# Patient Record
Sex: FEMALE
[De-identification: names, ages, dates, MRNs, and addresses within clinical notes are randomized per-mention and may not be internally consistent; named-entity substitution may affect disease eponyms.]

---

## 2018-08-15 ENCOUNTER — HOSPITAL (OUTPATIENT)
Dept: OTHER | Age: 28
End: 2018-08-15
Attending: EMERGENCY MEDICINE

## 2018-08-15 LAB
ALBUMIN SERPL-MCNC: 4 GM/DL (ref 3.6–5.1)
ALBUMIN/GLOB SERPL: 1.2 {RATIO} (ref 1–2.4)
ALP SERPL-CCNC: 53 UNIT/L (ref 45–117)
ALT SERPL-CCNC: 40 UNIT/L
AMORPH SED URNS QL MICRO: ABNORMAL
ANALYZER ANC (IANC): ABNORMAL
ANION GAP SERPL CALC-SCNC: 10 MMOL/L (ref 10–20)
APPEARANCE UR: ABNORMAL
AST SERPL-CCNC: 25 UNIT/L
BASOPHILS # BLD: 0 THOUSAND/MCL (ref 0–0.3)
BASOPHILS NFR BLD: 0 %
BILIRUB SERPL-MCNC: 0.4 MG/DL (ref 0.2–1)
BILIRUB UR QL: NEGATIVE
BUN SERPL-MCNC: 12 MG/DL (ref 6–20)
BUN/CREAT SERPL: 16 (ref 7–25)
CALCIUM SERPL-MCNC: 8.4 MG/DL (ref 8.4–10.2)
CAOX CRY URNS QL MICRO: ABNORMAL
CHLORIDE: 100 MMOL/L (ref 98–107)
CO2 SERPL-SCNC: 29 MMOL/L (ref 21–32)
COLOR UR: YELLOW
CREAT SERPL-MCNC: 0.75 MG/DL (ref 0.51–0.95)
DIFFERENTIAL METHOD BLD: ABNORMAL
EOSINOPHIL # BLD: 0 THOUSAND/MCL (ref 0.1–0.5)
EOSINOPHIL NFR BLD: 0 %
EPITH CASTS #/AREA URNS LPF: ABNORMAL /[LPF]
ERYTHROCYTE [DISTWIDTH] IN BLOOD: 12.5 % (ref 11–15)
FATTY CASTS #/AREA URNS LPF: ABNORMAL /[LPF]
GLOBULIN SER-MCNC: 3.4 GM/DL (ref 2–4)
GLUCOSE SERPL-MCNC: 112 MG/DL (ref 65–99)
GLUCOSE UR-MCNC: NEGATIVE MG/DL
GRAN CASTS #/AREA URNS LPF: ABNORMAL /[LPF]
HCG POINT OF CARE (5HGRST): NEGATIVE
HEMATOCRIT: 41 % (ref 36–46.5)
HGB BLD-MCNC: 14.2 GM/DL (ref 12–15.5)
HGB UR QL: NEGATIVE
HYALINE CASTS #/AREA URNS LPF: ABNORMAL /[LPF]
KETONES UR-MCNC: ABNORMAL MG/DL
LEUKOCYTE ESTERASE UR QL STRIP: NEGATIVE
LIPASE SERPL-CCNC: 165 UNIT/L (ref 73–393)
LYMPHOCYTES # BLD: 0.7 THOUSAND/MCL (ref 1–4.8)
LYMPHOCYTES NFR BLD: 6 %
MCH RBC QN AUTO: 31.1 PG (ref 26–34)
MCHC RBC AUTO-ENTMCNC: 34.6 GM/DL (ref 32–36.5)
MCV RBC AUTO: 89.9 FL (ref 78–100)
MICROSCOPIC (MT): ABNORMAL
MIXED CELL CASTS #/AREA URNS LPF: ABNORMAL /[LPF]
MONOCYTES # BLD: 0.6 THOUSAND/MCL (ref 0.3–0.9)
MONOCYTES NFR BLD: 5 %
MUCOUS THREADS URNS QL MICRO: ABNORMAL
NEUTROPHILS # BLD: 10.5 THOUSAND/MCL (ref 1.8–7.7)
NEUTROPHILS NFR BLD: 89 %
NEUTS SEG NFR BLD: ABNORMAL %
NITRITE UR QL: NEGATIVE
NRBC (NRBCRE): ABNORMAL
PH UR: 5 UNIT (ref 5–7)
PLATELET # BLD: 253 THOUSAND/MCL (ref 140–450)
POTASSIUM SERPL-SCNC: 4 MMOL/L (ref 3.4–5.1)
PROT SERPL-MCNC: 7.4 GM/DL (ref 6.4–8.2)
PROT UR QL: NEGATIVE MG/DL
RBC # BLD: 4.56 MILLION/MCL (ref 4–5.2)
RBC CASTS #/AREA URNS LPF: ABNORMAL /[LPF]
RENAL EPI CELLS #/AREA URNS HPF: ABNORMAL /[HPF]
SODIUM SERPL-SCNC: 135 MMOL/L (ref 135–145)
SP GR UR: 1.01 (ref 1–1.03)
SPECIMEN SOURCE: ABNORMAL
SPERM URNS QL MICRO: ABNORMAL
T VAGINALIS URNS QL MICRO: ABNORMAL
TRI-PHOS CRY URNS QL MICRO: ABNORMAL
URATE CRY URNS QL MICRO: ABNORMAL
URNS CMNT MICRO: ABNORMAL
UROBILINOGEN UR QL: 0.2 MG/DL (ref 0–1)
WAXY CASTS #/AREA URNS LPF: ABNORMAL /[LPF]
WBC # BLD: 11.9 THOUSAND/MCL (ref 4.2–11)
WBC CASTS #/AREA URNS LPF: ABNORMAL /[LPF]
YEAST HYPHAE URNS QL MICRO: ABNORMAL
YEAST URNS QL MICRO: ABNORMAL

## 2022-12-19 ENCOUNTER — OFFICE VISIT (OUTPATIENT)
Dept: FAMILY MEDICINE CLINIC | Age: 32
End: 2022-12-19
Payer: COMMERCIAL

## 2022-12-19 VITALS
DIASTOLIC BLOOD PRESSURE: 80 MMHG | TEMPERATURE: 98.5 F | SYSTOLIC BLOOD PRESSURE: 100 MMHG | WEIGHT: 156 LBS | HEART RATE: 69 BPM | OXYGEN SATURATION: 99 %

## 2022-12-19 DIAGNOSIS — N94.9 VAGINAL BURNING: ICD-10-CM

## 2022-12-19 DIAGNOSIS — N89.8 VAGINAL ITCHING: Primary | ICD-10-CM

## 2022-12-19 LAB
BILIRUBIN, POC: NEGATIVE
BLOOD URINE, POC: ABNORMAL
CLARITY, POC: CLEAR
COLOR, POC: ABNORMAL
GLUCOSE URINE, POC: NEGATIVE
KETONES, POC: NEGATIVE
LEUKOCYTE EST, POC: ABNORMAL
NITRITE, POC: NEGATIVE
PH, POC: 7
PROTEIN, POC: NEGATIVE
SPECIFIC GRAVITY, POC: 1.01
UROBILINOGEN, POC: NEGATIVE

## 2022-12-19 PROCEDURE — 81002 URINALYSIS NONAUTO W/O SCOPE: CPT | Performed by: NURSE PRACTITIONER

## 2022-12-19 PROCEDURE — 99203 OFFICE O/P NEW LOW 30 MIN: CPT | Performed by: NURSE PRACTITIONER

## 2022-12-19 RX ORDER — NORETHINDRONE ACETATE AND ETHINYL ESTRADIOL, ETHINYL ESTRADIOL AND FERROUS FUMARATE 1MG-10(24)
1 KIT ORAL DAILY
COMMUNITY
Start: 2022-12-05

## 2022-12-19 RX ORDER — LEVOTHYROXINE SODIUM 0.2 MG/1
TABLET ORAL
COMMUNITY

## 2022-12-19 ASSESSMENT — ENCOUNTER SYMPTOMS
CONSTIPATION: 0
ABDOMINAL PAIN: 0
SINUS PRESSURE: 0
SORE THROAT: 0
SINUS PAIN: 0
BACK PAIN: 0
NAUSEA: 0
VOMITING: 0
DIARRHEA: 0
RHINORRHEA: 0

## 2022-12-19 NOTE — PROGRESS NOTES
Steven Flair   28 y.o.  female  [id number]      Chief Complaint   Patient presents with    Vaginal Itching        Subjective:  28 y. o.female is here for a follow up. She has the following chronic/acute medical problems: There is no problem list on file for this patient. Vaginal Itching  The patient's primary symptoms include genital itching. The patient's pertinent negatives include no genital lesions, genital odor, genital rash, missed menses, pelvic pain, vaginal bleeding or vaginal discharge. This is a new problem. The current episode started in the past 7 days (Thuesday). The problem occurs constantly. The problem has been unchanged. The patient is experiencing no pain. She is not pregnant. Pertinent negatives include no abdominal pain, anorexia, back pain, chills, constipation, diarrhea, discolored urine, dysuria, fever, flank pain, frequency, headaches, hematuria, joint pain, joint swelling, nausea, painful intercourse, rash, sore throat, urgency or vomiting. Nothing aggravates the symptoms. Treatments tried: Monistat. The treatment provided no relief. She is sexually active. No, her partner does not have an STD. She uses oral contraceptives for contraception. Her menstrual history has been irregular (states normal started birth control for this). Review of Systems   Constitutional:  Negative for appetite change, chills, fatigue and fever. HENT:  Negative for congestion, ear pain, postnasal drip, rhinorrhea, sinus pressure, sinus pain, sneezing and sore throat. Cardiovascular:  Negative for chest pain and palpitations. Gastrointestinal:  Negative for abdominal pain, anorexia, constipation, diarrhea, nausea and vomiting. Genitourinary:  Negative for dysuria, flank pain, frequency, hematuria, missed menses, pelvic pain, urgency and vaginal discharge. Musculoskeletal:  Negative for back pain and joint pain. Skin:  Negative for rash.    Neurological:  Negative for dizziness, light-headedness and headaches. Current Outpatient Medications   Medication Sig Dispense Refill    LO LOESTRIN FE 1 MG-10 MCG / 10 MCG tablet Take 1 tablet by mouth daily      levothyroxine (SYNTHROID) 200 MCG tablet levothyroxine   200mcg 6 days/wk/day      vitamin D3 (CHOLECALCIFEROL) 125 MCG (5000 UT) TABS tablet Take 125 mcg by mouth daily       No current facility-administered medications for this visit. Past medical, family,surgical history reviewed today. Objective:  /80   Pulse 69   Temp 98.5 °F (36.9 °C) (Oral)   Wt 156 lb (70.8 kg)   LMP 12/05/2022 (Approximate)   SpO2 99%   Breastfeeding No   BP Readings from Last 3 Encounters:   12/19/22 100/80     Wt Readings from Last 3 Encounters:   12/19/22 156 lb (70.8 kg)         Physical Exam  Constitutional:       Appearance: Normal appearance. HENT:      Head: Normocephalic. Cardiovascular:      Rate and Rhythm: Normal rate and regular rhythm. Heart sounds: Normal heart sounds. Pulmonary:      Effort: Pulmonary effort is normal.      Breath sounds: Normal breath sounds. Genitourinary:     Vagina: Vaginal discharge present. Musculoskeletal:      Cervical back: Neck supple. Skin:     General: Skin is warm and dry. Neurological:      Mental Status: She is alert and oriented to person, place, and time.    Psychiatric:         Mood and Affect: Mood normal.         Behavior: Behavior normal.       No results found for: WBC, HGB, HCT, MCV, PLT  No results found for: NA, K, CL, CO2, BUN, CREATININE, GLUCOSE, CALCIUM, PROT, LABALBU, BILITOT, ALKPHOS, AST, ALT, LABGLOM, GFRAA, AGRATIO, GLOB  No results found for: CHOL  No results found for: TRIG  No results found for: HDL  No results found for: LDLCALC, LDLCHOLESTEROL  No results found for: LABA1C  No results found for: TSHFT4, TSH, TSHHS      Results for orders placed or performed in visit on 12/19/22   POCT Urinalysis no Micro   Result Value Ref Range    Color, UA Light Yellow     Clarity, UA Clear     Glucose, UA POC Negative     Bilirubin, UA Negative     Ketones, UA Negative     Spec Grav, UA 1.010     Blood, UA POC Small     pH, UA 7.0     Protein, UA POC Negative     Urobilinogen, UA Negative     Leukocytes, UA Large     Nitrite, UA Negative              ASSESSMENT/PLAN:      1. Vaginal itching  - C.trachomatis N.gonorrhoeae DNA, Urine  - VAGINAL PATHOGENS PROBE *A    2. Vaginal burning  POC urinalysis showed small blood and large leukocytes - patient states she is kind of on her period. Will send out for urine culture and call with results.   - C.trachomatis N.gonorrhoeae DNA, Urine  - VAGINAL PATHOGENS PROBE *A  - POCT Urinalysis no Micro  - Culture, Urine      No orders of the defined types were placed in this encounter. There are no discontinued medications. Care discussed with patient. Questions answered. Patient verbalizes understanding and agrees with plan. After visit summary provided. Advised to call for any problems, questions, or concerns. Return if symptoms worsen or fail to improve.                                              Signed:  AMARA Shaw CNP  12/19/22  4:29 PM

## 2022-12-20 LAB
C. TRACHOMATIS DNA ,URINE: NEGATIVE
CANDIDA SPECIES, DNA PROBE: NORMAL
GARDNERELLA VAGINALIS, DNA PROBE: NORMAL
N. GONORRHOEAE DNA, URINE: NEGATIVE
TRICHOMONAS VAGINALIS DNA: NORMAL
URINE CULTURE, ROUTINE: NORMAL

## 2023-02-14 ENCOUNTER — APPOINTMENT (OUTPATIENT)
Dept: URBAN - METROPOLITAN AREA CLINIC 314 | Age: 33
Setting detail: DERMATOLOGY
End: 2023-02-14

## 2023-02-14 DIAGNOSIS — B02.9 ZOSTER WITHOUT COMPLICATIONS: ICD-10-CM

## 2023-02-14 PROCEDURE — OTHER COUNSELING: OTHER

## 2023-02-14 PROCEDURE — OTHER PRESCRIPTION MEDICATION MANAGEMENT: OTHER

## 2023-02-14 PROCEDURE — OTHER PRESCRIPTION: OTHER

## 2023-02-14 PROCEDURE — 99203 OFFICE O/P NEW LOW 30 MIN: CPT

## 2023-02-14 PROCEDURE — OTHER MIPS QUALITY: OTHER

## 2023-02-14 RX ORDER — VALACYCLOVIR 1 G/1
TABLET, FILM COATED ORAL TID
Qty: 21 | Refills: 0 | Status: ERX | COMMUNITY
Start: 2023-02-14

## 2023-02-14 ASSESSMENT — LOCATION SIMPLE DESCRIPTION DERM
LOCATION SIMPLE: LEFT LOWER BACK
LOCATION SIMPLE: LOWER BACK

## 2023-02-14 ASSESSMENT — LOCATION DETAILED DESCRIPTION DERM
LOCATION DETAILED: SUPERIOR LUMBAR SPINE
LOCATION DETAILED: LEFT SUPERIOR LATERAL LOWER BACK

## 2023-02-14 ASSESSMENT — LOCATION ZONE DERM: LOCATION ZONE: TRUNK

## 2023-02-14 NOTE — PROCEDURE: PRESCRIPTION MEDICATION MANAGEMENT
Render In Strict Bullet Format?: No
01-Jan-2022 03:51
Initiate Treatment: -\\nvalacyclovir 1 gram tablet TID\\nTake one pill by mouth three times daily for 7 days.
Detail Level: Zone

## 2024-01-19 ENCOUNTER — LAB REQUISITION (OUTPATIENT)
Dept: LAB | Age: 34
End: 2024-01-19

## 2024-01-19 DIAGNOSIS — M35.00 SJOGREN SYNDROME, UNSPECIFIED (CMD): ICD-10-CM

## 2024-01-19 PROCEDURE — 88305 TISSUE EXAM BY PATHOLOGIST: CPT | Performed by: CLINICAL MEDICAL LABORATORY

## 2024-01-24 LAB
ASR DISCLAIMER: NORMAL
CASE RPRT: NORMAL
CLINICAL INFO: NORMAL
PATH REPORT.FINAL DX SPEC: NORMAL
PATH REPORT.FINAL DX SPEC: NORMAL
PATH REPORT.GROSS SPEC: NORMAL

## 2024-12-04 ENCOUNTER — TELEPHONE (OUTPATIENT)
Dept: FAMILY MEDICINE | Age: 34
End: 2024-12-04

## 2025-01-15 ENCOUNTER — APPOINTMENT (OUTPATIENT)
Dept: FAMILY MEDICINE | Age: 35
End: 2025-01-15

## 2025-01-15 ENCOUNTER — TELEPHONE (OUTPATIENT)
Dept: FAMILY MEDICINE | Age: 35
End: 2025-01-15